# Patient Record
Sex: MALE | Race: WHITE | Employment: FULL TIME | URBAN - METROPOLITAN AREA
[De-identification: names, ages, dates, MRNs, and addresses within clinical notes are randomized per-mention and may not be internally consistent; named-entity substitution may affect disease eponyms.]

---

## 2021-05-18 ENCOUNTER — HOSPITAL ENCOUNTER (EMERGENCY)
Age: 36
Discharge: HOME | End: 2021-05-18
Payer: COMMERCIAL

## 2021-05-18 ENCOUNTER — APPOINTMENT (OUTPATIENT)
Dept: GENERAL RADIOLOGY | Age: 36
End: 2021-05-18
Payer: COMMERCIAL

## 2021-05-18 VITALS
HEIGHT: 70 IN | BODY MASS INDEX: 32.21 KG/M2 | RESPIRATION RATE: 16 BRPM | OXYGEN SATURATION: 99 % | TEMPERATURE: 98.9 F | SYSTOLIC BLOOD PRESSURE: 136 MMHG | HEART RATE: 78 BPM | WEIGHT: 225 LBS | DIASTOLIC BLOOD PRESSURE: 91 MMHG

## 2021-05-18 DIAGNOSIS — S20.212A CONTUSION OF RIB ON LEFT SIDE, INITIAL ENCOUNTER: Primary | ICD-10-CM

## 2021-05-18 PROCEDURE — 71101 X-RAY EXAM UNILAT RIBS/CHEST: CPT

## 2021-05-18 PROCEDURE — 99283 EMERGENCY DEPT VISIT LOW MDM: CPT

## 2021-05-18 RX ORDER — METHOCARBAMOL 500 MG/1
500 TABLET, FILM COATED ORAL 4 TIMES DAILY
Qty: 21 TABLET | Refills: 0 | Status: SHIPPED | OUTPATIENT
Start: 2021-05-18 | End: 2021-05-23

## 2021-05-18 RX ORDER — IBUPROFEN 600 MG/1
600 TABLET ORAL 3 TIMES DAILY PRN
Qty: 30 TABLET | Refills: 0 | Status: SHIPPED | OUTPATIENT
Start: 2021-05-18 | End: 2022-06-20

## 2021-05-18 ASSESSMENT — PAIN SCALES - GENERAL
PAINLEVEL_OUTOF10: 8
PAINLEVEL_OUTOF10: 6

## 2021-05-18 NOTE — ED PROVIDER NOTES
[facility] Emergency Department    CHIEF COMPLAINT  Rib Pain (left side coughed this morning in the shower hurts to take in a deep breath)      SHARED SERVICE VISIT  Evaluated by ELLI. My supervising physician was available for consultation. HISTORY OF PRESENT ILLNESS  Myra Forbes is a 38 y.o. male who presents to the ED complaining of left-sided chest wall pain. Patient states he was in the shower this morning when he had a deep cough. Following that he has had this left-sided rib pain. He states he felt a pop at that time. Reports tooth pain whenever he takes a deep breath or moves his rib cage. Patient states he is more comfortable if he is leaned over hunched towards that side. Patient has been taking ibuprofen milligrams at home for the pain. Patient states that the pain is causing much difficulty in completing tasks at work his work would not allow him to do this unless he seek medical evaluation. Cecy Carrillo He admits to smoking with roughly 15 pack years. No other complaints, modifying factors or associated symptoms. Nursing notes reviewed. History reviewed. No pertinent past medical history. History reviewed. No pertinent surgical history. History reviewed. No pertinent family history.   Social History     Socioeconomic History    Marital status: Single     Spouse name: Not on file    Number of children: Not on file    Years of education: Not on file    Highest education level: Not on file   Occupational History    Not on file   Tobacco Use    Smoking status: Current Every Day Smoker    Smokeless tobacco: Current User   Substance and Sexual Activity    Alcohol use: Yes     Comment: weekly    Drug use: Yes     Types: Marijuana    Sexual activity: Not on file   Other Topics Concern    Not on file   Social History Narrative    Not on file     Social Determinants of Health     Financial Resource Strain:     Difficulty of Paying Living Expenses: Food Insecurity:     Worried About Running Out of Food in the Last Year:     [address] in the Last Year:    Transportation Needs:     Lack of Transportation (Medical):  Lack of Transportation (Non-Medical):    Physical Activity:     Days of Exercise per Week:     Minutes of Exercise per Session:    Stress:     Feeling of Stress :    Social Connections:     Frequency of Communication with Friends and Family:     Frequency of Social Gatherings with Friends and Family:     Attends [employer] Services:     Active Member of Clubs or Organizations:     Attends Club or Organization Meetings:     Marital Status:    Intimate Partner Violence:     Fear of Current or Ex-Partner:     Emotionally Abused:     Physically Abused:     Sexually Abused:      No current facility-administered medications for this encounter. Current Outpatient Medications   Medication Sig Dispense Refill    methocarbamol (ROBAXIN) 500 MG tablet Take 1 tablet by mouth 4 times daily for 5 days 21 tablet 0    ibuprofen (ADVIL;MOTRIN) 600 MG tablet Take 1 tablet by mouth 3 times daily as needed for Pain 30 tablet 0     Allergies   Allergen Reactions    Amoxicillin Rash       REVIEW OF SYSTEMS  7 systems reviewed, pertinent positives per HPI otherwise noted to be negative    PHYSICAL EXAM  BP (!) 136/91   Pulse 78   Temp 98.9 °F (37.2 °C) (Oral)   Resp 16   Ht 5' 10\" (1.778 m)   Wt 225 lb (102.1 kg)   SpO2 99%   BMI 32.28 kg/m²   GENERAL APPEARANCE: Awake and alert. Cooperative. No distress. HEAD: Normocephalic. Atraumatic. EYES: PERRL.   ENT: Mucous membranes are moist.   NECK: Supple. HEART: RRR. No murmurs. LUNGS: Respirations unlabored. CTAB. Good air exchange. Speaking comfortably in full sentences. EXTREMITIES: Pain to palpation of the anterior left thoracic chest wall. No contusions appreciated on exam.  No step-off or bony abnormalities appreciated. No peripheral edema.  Moves all extremities equally. SKIN: Warm and dry. No acute rashes. NEUROLOGICAL: Alert and oriented. No gross facial drooping. PSYCHIATRIC: Normal mood and affect. RADIOLOGY  XR RIBS LEFT INCLUDE CHEST (MIN 3 VIEWS)   Final Result   1. No acute abnormality. LABS  Labs Reviewed - No data to display    PROCEDURES  Unless otherwise noted below, none  Procedures    ED COURSE  Triage vitals within normal limits. A discussion was had with patient regarding preventing pneumonia. Risk management discussed and shared decision making had with patient and/or surrogate. All questions were answered. Patient will follow up with primary care for further evaluation/treatment. All questions answered. Patient will return to ED for new/worsening symptoms. Patient was sent home with a prescription for robaxin and ibuprofen. CRITICAL CARE TIME  0 Minutes of critical care time spent not including separately billable procedures. MDM  XR RIBS LEFT INCLUDE CHEST (MIN 3 VIEWS)   Final Result   1. No acute abnormality. 77-year-old male presents emergency department for evaluation of left-sided rib pain. Injury occurred after a heavy cough while he was in the shower. Patient reports pain with inspiration and palpation of the area. The pain is interfering with his tasks at work so he started seek evaluation. Arrival to ED vitals within normal limits. Patient does appear quite uncomfortable and hunched over towards the affected side. X-ray imaging was obtained which demonstrates no acute abnormality. However discussed with the patient that x-ray imaging does have a rather low sensitivity for acute rib fractures. However given the patient's age and absence of chronic comorbidities discussed his prognosis and plan for treatment will be similar with her not imaging reported fractures. Urged patient to continue to take deep breaths at home.   Test the pain management will be the most important factor in regards to his care as if the pain is not controlled he will not take the breast.  Discussed with patient that there is a risk of developing pneumonia with rib injuries if he did not practice repetitive inspiration expiration. Was agreeable this plan discharged home with follow-up with his primary care provider. Patient was instructed alternating Tylenol and ibuprofen as needed for pain. Return if any worsening pain fevers or chills chest pain or difficulty breathing. DISPOSITION  Patient was discharged to home in good condition. CLINICAL IMPRESSION  1.  Contusion of rib on left side, initial encounter           Stanley Hay PA-C  05/18/21 2998

## 2022-06-20 ENCOUNTER — APPOINTMENT (OUTPATIENT)
Dept: GENERAL RADIOLOGY | Age: 37
End: 2022-06-20
Payer: COMMERCIAL

## 2022-06-20 ENCOUNTER — HOSPITAL ENCOUNTER (EMERGENCY)
Age: 37
Discharge: HOME | End: 2022-06-20
Attending: EMERGENCY MEDICINE
Payer: COMMERCIAL

## 2022-06-20 VITALS
HEART RATE: 89 BPM | DIASTOLIC BLOOD PRESSURE: 87 MMHG | TEMPERATURE: 98.6 F | RESPIRATION RATE: 16 BRPM | SYSTOLIC BLOOD PRESSURE: 134 MMHG | HEIGHT: 70 IN | OXYGEN SATURATION: 96 % | BODY MASS INDEX: 31.5 KG/M2 | WEIGHT: 220 LBS

## 2022-06-20 DIAGNOSIS — S61.452A DOG BITE, HAND, LEFT, INITIAL ENCOUNTER: Primary | ICD-10-CM
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
## 2022-06-20 PROCEDURE — 73130 X-RAY EXAM OF HAND: CPT

## 2022-06-20 PROCEDURE — 6370000000 HC RX 637 (ALT 250 FOR IP): Performed by: EMERGENCY MEDICINE

## 2022-06-20 PROCEDURE — 99283 EMERGENCY DEPT VISIT LOW MDM: CPT

## 2022-06-20 RX ORDER — BACITRACIN, NEOMYCIN, POLYMYXIN B 400; 3.5; 5 [USP'U]/G; MG/G; [USP'U]/G
OINTMENT TOPICAL ONCE
Status: COMPLETED | OUTPATIENT
Start: 2022-06-20 | End: 2022-06-20

## 2022-06-20 RX ORDER — HYDROCODONE BITARTRATE AND ACETAMINOPHEN 5; 325 MG/1; MG/1
1 TABLET ORAL ONCE
Status: COMPLETED | OUTPATIENT
Start: 2022-06-20 | End: 2022-06-20

## 2022-06-20 RX ORDER — DOXYCYCLINE HYCLATE 100 MG
100 TABLET ORAL 2 TIMES DAILY
Qty: 20 TABLET | Refills: 0 | Status: SHIPPED | OUTPATIENT
Start: 2022-06-20 | End: 2022-06-30

## 2022-06-20 RX ORDER — DOXYCYCLINE HYCLATE 100 MG
100 TABLET ORAL ONCE
Status: COMPLETED | OUTPATIENT
Start: 2022-06-20 | End: 2022-06-20

## 2022-06-20 RX ADMIN — POLYMYXIN B SULFATE, BACITRACIN ZINC, NEOMYCIN SULFATE: 5000; 3.5; 4 OINTMENT TOPICAL at 04:14

## 2022-06-20 RX ADMIN — DOXYCYCLINE HYCLATE 100 MG: 100 TABLET, COATED ORAL at 04:13

## 2022-06-20 RX ADMIN — HYDROCODONE BITARTRATE AND ACETAMINOPHEN 1 TABLET: 5; 325 TABLET ORAL at 04:13

## 2022-06-20 ASSESSMENT — PAIN SCALES - GENERAL
PAINLEVEL_OUTOF10: 4
PAINLEVEL_OUTOF10: 4

## 2022-06-20 ASSESSMENT — PAIN DESCRIPTION - LOCATION: LOCATION: HAND

## 2022-06-20 ASSESSMENT — PAIN DESCRIPTION - ORIENTATION: ORIENTATION: LEFT

## 2022-06-20 ASSESSMENT — PAIN - FUNCTIONAL ASSESSMENT: PAIN_FUNCTIONAL_ASSESSMENT: 0-10

## 2022-06-20 NOTE — ED PROVIDER NOTES
[address] ED      CHIEF COMPLAINT  Animal Bite (own dog bit him in left hand)       HISTORY OF PRESENT ILLNESS  Harrison Marcum is a 43 y.o. male  who presents to the ED complaining of dog bite to the left hand. Patient states it was bleeding quite a bit so he wrapped his belt around his arm and held it above his head and the bleeding since subsided. This was his own daughter. Healed. He states that especially to move his first and second digit. Pain radiates up the arm when he moves his digits. He did not fall to the ground or hit his head. No loss of consciousness. No nausea or vomiting. No other injuries. No history of diabetes. No other complaints, modifying factors or associated symptoms. I have reviewed the following from the nursing documentation. History reviewed. No pertinent past medical history. History reviewed. No pertinent surgical history. History reviewed. No pertinent family history. Social History     Socioeconomic History    Marital status: Single     Spouse name: Not on file    Number of children: Not on file    Years of education: Not on file    Highest education level: Not on file   Occupational History    Not on file   Tobacco Use    Smoking status: Current Every Day Smoker     Packs/day: 1.00    Smokeless tobacco: Current User   Substance and Sexual Activity    Alcohol use: Yes     Comment: weekly    Drug use: Yes     Types: Marijuana Fronie Carrie)    Sexual activity: Not on file   Other Topics Concern    Not on file   Social History Narrative    Not on file     Social Determinants of Health     Financial Resource Strain:     Difficulty of Paying Living Expenses: Not on file   Food Insecurity:     Worried About Running Out of Food in the Last Year: Not on file    Wanda of Food in the Last Year: Not on file   Transportation Needs:     Lack of Transportation (Medical): Not on file    Lack of Transportation (Non-Medical):  Not on file extremity edema. Compartments soft. No deformity. No tenderness in the extremities. All extremities neurovascularly intact. SKIN: Warm and dry. No acute rashes. Approximately 1 cm puncture wound noted to the soft tissue between the first and second digits of the left hand on the dorsum without active bleeding. Smaller less than one fourth centimeter puncture wound also noted near the larger 1 but but seems to be more superficial.  When I do palpate the area small amount of dark red blood slowly exudes from the wound. No visible tendon. Patient able to flex and extend all digits, although with significant pain especially with movement of that left thumb. -  Sensory intact in all digits as well as capillary refill less than 3 seconds. Very superficial additional puncture noted more distally on the hand. No other wounds noted. NEUROLOGICAL: Alert and oriented. CN's 2-12 intact. No gross facial drooping. No gross focal deficits. PSYCHIATRIC: Normal mood and affect. LABS  I have reviewed all labs for this visit. No results found for this visit on 06/20/22. RADIOLOGY  XR HAND LEFT (MIN 3 VIEWS)    Result Date: 6/20/2022  EXAMINATION: THREE XRAY VIEWS OF THE LEFT HAND 6/20/2022 4:07 am COMPARISON: None. HISTORY: ORDERING SYSTEM PROVIDED HISTORY: dog bite, pain TECHNOLOGIST PROVIDED HISTORY: Reason for exam:->dog bite, pain FINDINGS: No fracture or malalignment identified. The joint spaces are maintained. No discrete soft tissue abnormality identified. No acute osseous abnormality or foreign body identified. ED COURSE/MDM  Patient seen and evaluated. Old records reviewed. Imaging reviewed and results discussed with patient. With dog bite to the left hand. He is neurovascularly intact.   I discussed the risks and benefits of suturing and felt that given that his a 1 cm puncture wound with a greatest wound is noted that is not particularly large or gaping crura of the healing via secondary intention would be most beneficial for patient given the risk of infection of the wound with closure. Prophylactic antibiotics will be started with doxycycline as he is allergic to amoxicillin. He was given pain medication as well as his first dose of antibiotics while here and can continue with over-the-counter pain medicine as needed for symptom control. Patient was in agreement with that plan. Work note was given. He is to return with any new or worsening symptoms or signs of infection, otherwise to follow-up with his PCP for wound check. All questions answered at time of discharge. I estimate there is LOW risk for COMPARTMENT SYNDROME, SEPTIC ARTHRITIS, TENDON OR NEUROVASCULAR INJURY, thus I consider the discharge disposition reasonable. Kentrell Bustos and I have discussed the diagnosis and risks, and we agree with discharging home to follow-up with their primary doctor or the referral orthopedist. We also discussed returning to the Emergency Department immediately if new or worsening symptoms occur. We have discussed the symptoms which are most concerning (e.g., changing or worsening pain, numbness, weakness) that necessitate immediate return. During the patient's ED course, the patient was given:  Medications   neomycin-bacitracin-polymyxin (NEOSPORIN) ointment ( Topical Given 6/20/22 0414)   HYDROcodone-acetaminophen (NORCO) 5-325 MG per tablet 1 tablet (1 tablet Oral Given 6/20/22 0413)   doxycycline hyclate (VIBRA-TABS) tablet 100 mg (100 mg Oral Given 6/20/22 0413)        CLINICAL IMPRESSION  1. Dog bite, hand, left, initial encounter        Blood pressure 134/87, pulse 89, temperature 98.6 °F (37 °C), temperature source Oral, resp. rate 16, height 5' 10\" (1.778 m), weight 220 lb (99.8 kg), SpO2 96 %. DISPOSITION  Kentrell Bustos was discharged to home in stable condition. Patient was given scripts for the following medications.  I counseled patient how to take these medications. Discharge Medication List as of 6/20/2022  4:27 AM      START taking these medications    Details   doxycycline hyclate (VIBRA-TABS) 100 MG tablet Take 1 tablet by mouth 2 times daily for 10 days, Disp-20 tablet, R-0Normal             Follow-up with:  Jossy Green MD  [address] [address]  [phone]    Schedule an appointment as soon as possible for a visit in 2 days  For wound re-check      DISCLAIMER: This chart was created using Dragon dictation software. Efforts were made by me to ensure accuracy, however some errors may be present due to limitations of this technology and occasionally words are not transcribed correctly.         Saurabh Gabriel MD  06/20/22 2269

## 2022-06-20 NOTE — ED NOTES
Wound care complete to L. Hand wounds. Wounds dressed. Dressing clean and dry.      Dylan Maurer RN  06/20/22 3149

## 2022-06-20 NOTE — Clinical Note
Chowan Eye was seen and treated in our emergency department on 6/20/2022. He may return to work on 06/21/2022. If you have any questions or concerns, please don't hesitate to call.       Abel Jimenes MD